# Patient Record
Sex: FEMALE | Race: WHITE | NOT HISPANIC OR LATINO | Employment: UNEMPLOYED | URBAN - METROPOLITAN AREA
[De-identification: names, ages, dates, MRNs, and addresses within clinical notes are randomized per-mention and may not be internally consistent; named-entity substitution may affect disease eponyms.]

---

## 2024-05-02 ENCOUNTER — OFFICE VISIT (OUTPATIENT)
Dept: PRIMARY CARE | Facility: CLINIC | Age: 49
End: 2024-05-02
Payer: COMMERCIAL

## 2024-05-02 VITALS
HEART RATE: 100 BPM | TEMPERATURE: 98 F | SYSTOLIC BLOOD PRESSURE: 108 MMHG | WEIGHT: 116 LBS | RESPIRATION RATE: 20 BRPM | HEIGHT: 64 IN | BODY MASS INDEX: 19.81 KG/M2 | DIASTOLIC BLOOD PRESSURE: 70 MMHG

## 2024-05-02 DIAGNOSIS — N63.20 MASS OF LEFT BREAST, UNSPECIFIED QUADRANT: ICD-10-CM

## 2024-05-02 DIAGNOSIS — Z00.00 WELLNESS EXAMINATION: Primary | ICD-10-CM

## 2024-05-02 DIAGNOSIS — Z12.31 ENCOUNTER FOR SCREENING MAMMOGRAM FOR MALIGNANT NEOPLASM OF BREAST: ICD-10-CM

## 2024-05-02 DIAGNOSIS — Z12.11 COLON CANCER SCREENING: ICD-10-CM

## 2024-05-02 DIAGNOSIS — F17.200 SMOKER: ICD-10-CM

## 2024-05-02 PROBLEM — N95.1 PERIMENOPAUSAL VASOMOTOR SYMPTOMS: Status: ACTIVE | Noted: 2024-05-02

## 2024-05-02 PROBLEM — M71.9 DISORDER OF BURSAE OF SHOULDER REGION: Status: RESOLVED | Noted: 2017-07-24 | Resolved: 2024-05-02

## 2024-05-02 PROBLEM — J01.00 ACUTE MAXILLARY SINUSITIS: Status: RESOLVED | Noted: 2024-05-02 | Resolved: 2024-05-02

## 2024-05-02 PROCEDURE — 99386 PREV VISIT NEW AGE 40-64: CPT | Performed by: NURSE PRACTITIONER

## 2024-05-02 RX ORDER — IBUPROFEN 200 MG
1 TABLET ORAL EVERY 24 HOURS
Qty: 30 PATCH | Refills: 0 | Status: SHIPPED | OUTPATIENT
Start: 2024-05-02 | End: 2024-06-01

## 2024-05-02 RX ORDER — NICOTINE 7MG/24HR
1 PATCH, TRANSDERMAL 24 HOURS TRANSDERMAL EVERY 24 HOURS
Qty: 14 PATCH | Refills: 0 | Status: SHIPPED | OUTPATIENT
Start: 2024-06-14 | End: 2024-06-28

## 2024-05-02 RX ORDER — IBUPROFEN 200 MG
1 TABLET ORAL EVERY 24 HOURS
Qty: 14 PATCH | Refills: 0 | Status: SHIPPED | OUTPATIENT
Start: 2024-06-01 | End: 2024-06-15

## 2024-05-02 ASSESSMENT — ENCOUNTER SYMPTOMS
SHORTNESS OF BREATH: 0
ABDOMINAL PAIN: 0
CONSTIPATION: 0
FATIGUE: 1
DIARRHEA: 0
COUGH: 1
WHEEZING: 0
FEVER: 0
PALPITATIONS: 0
CHILLS: 0
HEADACHES: 0
WEAKNESS: 0

## 2024-05-02 NOTE — PROGRESS NOTES
"Subjective   Jennifer Baez is a 48 y.o. female who presents for Annual Exam.  HPI States her periods stopped 7-9 months ago.   Patient reported health fair  Regular Dental Visits no  Regular Eye Visits no  Hearing loss no  Balanced Diet yes  Weight Concerns no  Exercise Regular  Review of Systems   Constitutional:  Positive for fatigue. Negative for chills and fever.   Respiratory:  Positive for cough. Negative for shortness of breath and wheezing.    Cardiovascular:  Negative for chest pain and palpitations.   Gastrointestinal:  Negative for abdominal pain, constipation and diarrhea.   Neurological:  Negative for weakness and headaches.     Objective   Physical Exam  Vitals reviewed.   Constitutional:       Appearance: Normal appearance.   HENT:      Head: Normocephalic.   Eyes:      Conjunctiva/sclera: Conjunctivae normal.   Cardiovascular:      Rate and Rhythm: Normal rate and regular rhythm.      Pulses: Normal pulses.   Pulmonary:      Breath sounds: Normal breath sounds.   Abdominal:      General: Bowel sounds are normal.      Palpations: Abdomen is soft.   Musculoskeletal:      Cervical back: Neck supple.   Skin:     General: Skin is warm and dry.   Neurological:      General: No focal deficit present.      Mental Status: She is alert and oriented to person, place, and time.   Psychiatric:         Mood and Affect: Mood normal.         Thought Content: Thought content normal.       /70   Pulse 100   Temp 36.7 °C (98 °F)   Resp 20   Ht 1.613 m (5' 3.5\")   Wt 52.6 kg (116 lb)   BMI 20.23 kg/m²   Assessment/Plan   Problem List Items Addressed This Visit       Breast mass, left    Current Assessment & Plan     States she had a biopsy and that it was found to be benign. She does not recall what year this was done.           Other Visit Diagnoses       Wellness examination    -  Primary    Relevant Orders    CBC    Lipid Panel    Hemoglobin A1C    Comprehensive Metabolic Panel    Vitamin B12    Vitamin " D 1,25 Dihydroxy (for eval of hypercalcemia)    TSH with reflex to Free T4 if abnormal    Colon cancer screening        Relevant Orders    Cologuard® colon cancer screening    Encounter for screening mammogram for malignant neoplasm of breast        Relevant Orders    BI mammo bilateral screening tomosynthesis    Smoker        Started smoking around 13 years old, regularly at 16. She is intersted in quitting.    Relevant Medications    nicotine (Nicoderm CQ) 21 mg/24 hr patch    nicotine (Nicoderm CQ) 14 mg/24 hr patch (Start on 6/1/2024)    nicotine (Nicoderm CQ) 7 mg/24 hr patch (Start on 6/14/2024)

## 2024-05-02 NOTE — ASSESSMENT & PLAN NOTE
States she had a biopsy and that it was found to be benign. She does not recall what year this was done.

## 2024-05-13 ENCOUNTER — APPOINTMENT (OUTPATIENT)
Dept: RADIOLOGY | Facility: CLINIC | Age: 49
End: 2024-05-13
Payer: COMMERCIAL

## 2024-11-04 ENCOUNTER — APPOINTMENT (OUTPATIENT)
Dept: PRIMARY CARE | Facility: CLINIC | Age: 49
End: 2024-11-04
Payer: COMMERCIAL

## 2025-01-02 ENCOUNTER — APPOINTMENT (OUTPATIENT)
Dept: PRIMARY CARE | Facility: CLINIC | Age: 50
End: 2025-01-02
Payer: COMMERCIAL

## 2025-04-01 ENCOUNTER — APPOINTMENT (OUTPATIENT)
Dept: PRIMARY CARE | Facility: CLINIC | Age: 50
End: 2025-04-01
Payer: COMMERCIAL

## 2025-04-01 VITALS
WEIGHT: 111 LBS | DIASTOLIC BLOOD PRESSURE: 90 MMHG | BODY MASS INDEX: 18.95 KG/M2 | OXYGEN SATURATION: 98 % | HEIGHT: 64 IN | RESPIRATION RATE: 16 BRPM | HEART RATE: 93 BPM | TEMPERATURE: 98.3 F | SYSTOLIC BLOOD PRESSURE: 183 MMHG

## 2025-04-01 DIAGNOSIS — E55.9 VITAMIN D INSUFFICIENCY: ICD-10-CM

## 2025-04-01 DIAGNOSIS — I10 PRIMARY HYPERTENSION: ICD-10-CM

## 2025-04-01 DIAGNOSIS — R92.8 ABNORMAL MAMMOGRAM: ICD-10-CM

## 2025-04-01 DIAGNOSIS — N95.1 VASOMOTOR SYMPTOMS DUE TO MENOPAUSE: ICD-10-CM

## 2025-04-01 DIAGNOSIS — Z13.29 SCREENING FOR THYROID DISORDER: ICD-10-CM

## 2025-04-01 DIAGNOSIS — Z13.228 ENCOUNTER FOR SCREENING FOR OTHER METABOLIC DISORDERS: ICD-10-CM

## 2025-04-01 DIAGNOSIS — N63.11 MASS OF UPPER OUTER QUADRANT OF RIGHT BREAST: Primary | ICD-10-CM

## 2025-04-01 DIAGNOSIS — Z13.220 SCREENING FOR LIPID DISORDERS: ICD-10-CM

## 2025-04-01 PROCEDURE — 99214 OFFICE O/P EST MOD 30 MIN: CPT | Performed by: NURSE PRACTITIONER

## 2025-04-01 ASSESSMENT — ENCOUNTER SYMPTOMS
ADENOPATHY: 1
UNEXPECTED WEIGHT CHANGE: 0
APPETITE CHANGE: 0
CHILLS: 0
FATIGUE: 0
SHORTNESS OF BREATH: 0
FEVER: 0

## 2025-04-01 NOTE — PROGRESS NOTES
"Subjective   Jennifer Baez is a 49 y.o. female who presents for Breast Mass.  This is on right side. She states it is the size of a golf ball and thinks she feels axillary lymph nodes as well. Denies c/o pain. Denies nipple changes. States she has rare clear drainage from nipples, but states that isn't a new problem.     HPI  Review of Systems   Constitutional:  Negative for appetite change, chills, fatigue, fever and unexpected weight change.   Respiratory:  Negative for shortness of breath.    Cardiovascular:  Negative for chest pain.   Genitourinary:  Negative for menstrual problem (No longer having menses. States it's been over a year, but doesn't remember exactly how long.).   Hematological:  Positive for adenopathy (right anterior axillary firm, oval, freely mobile, nodes).       Objective     Physical Exam  Constitutional:       General: She is not in acute distress.     Appearance: She is underweight. She is not ill-appearing.   Cardiovascular:      Rate and Rhythm: Normal rate and regular rhythm.   Chest:   Breasts:     Breasts are asymmetrical.      Right: Mass (Large, hard, round, freely mobile mass at 9-10 o'clock position adjacent to the areola) present. No bleeding, inverted nipple, nipple discharge, skin change or tenderness.      Left: Normal.       Lymphadenopathy:      Head:      Right side of head: No submental adenopathy.      Left side of head: No submental adenopathy.      Cervical: No cervical adenopathy.      Upper Body:      Right upper body: Axillary adenopathy (2 right axillary firm, oval/round, freely mobile LNs approx 1 cm diameter each) present. No supraclavicular or pectoral adenopathy.      Left upper body: No supraclavicular, axillary or pectoral adenopathy.   Neurological:      Mental Status: She is alert.         BP (!) 183/90   Pulse 93   Temp 36.8 °C (98.3 °F)   Resp 16   Ht 1.626 m (5' 4\")   Wt 50.3 kg (111 lb)   SpO2 98%   BMI 19.05 kg/m²     Assessment/Plan     Problem " List Items Addressed This Visit       Vasomotor symptoms due to menopause    Overview     States last menses was well over a year ago. Doesn't recall exactly when.          Relevant Orders    Referral to Gynecology     Other Visit Diagnoses       Mass of upper outer quadrant of right breast    -  Primary    Relevant Orders    Follow Up In [facility] - PCP - Health Maintenance    Abnormal mammogram        Relevant Orders    BI mammo bilateral diagnostic tomosynthesis        F/U in 1-2 weeks for review of results.

## 2025-04-08 ENCOUNTER — APPOINTMENT (OUTPATIENT)
Dept: RADIOLOGY | Facility: CLINIC | Age: 50
End: 2025-04-08
Payer: COMMERCIAL

## 2025-04-08 ENCOUNTER — HOSPITAL ENCOUNTER (OUTPATIENT)
Dept: RADIOLOGY | Facility: CLINIC | Age: 50
End: 2025-04-08
Payer: COMMERCIAL

## 2025-04-11 ENCOUNTER — HOSPITAL ENCOUNTER (OUTPATIENT)
Dept: RADIOLOGY | Facility: HOSPITAL | Age: 50
Discharge: HOME | End: 2025-04-11
Payer: COMMERCIAL

## 2025-04-11 VITALS — BODY MASS INDEX: 18.95 KG/M2 | HEIGHT: 64 IN | WEIGHT: 111 LBS

## 2025-04-11 DIAGNOSIS — R92.8 ABNORMAL MAMMOGRAM: ICD-10-CM

## 2025-04-11 DIAGNOSIS — N63.0 UNSPECIFIED LUMP IN UNSPECIFIED BREAST: ICD-10-CM

## 2025-04-11 DIAGNOSIS — R92.1 CALCIFICATION OF LEFT BREAST ON MAMMOGRAPHY: Primary | ICD-10-CM

## 2025-04-11 DIAGNOSIS — R92.8 OTHER ABNORMAL AND INCONCLUSIVE FINDINGS ON DIAGNOSTIC IMAGING OF BREAST: ICD-10-CM

## 2025-04-11 PROBLEM — I10 PRIMARY HYPERTENSION: Status: ACTIVE | Noted: 2025-04-11

## 2025-04-11 PROBLEM — N63.20 BREAST MASS, LEFT: Status: RESOLVED | Noted: 2024-05-02 | Resolved: 2025-04-11

## 2025-04-11 PROBLEM — N63.11 MASS OF UPPER OUTER QUADRANT OF RIGHT BREAST: Status: ACTIVE | Noted: 2025-04-11

## 2025-04-11 PROBLEM — E55.9 VITAMIN D INSUFFICIENCY: Status: ACTIVE | Noted: 2025-04-11

## 2025-04-11 PROBLEM — R59.0 AXILLARY LYMPHADENOPATHY: Status: ACTIVE | Noted: 2025-04-11

## 2025-04-11 PROCEDURE — 76642 ULTRASOUND BREAST LIMITED: CPT | Mod: RT

## 2025-04-11 PROCEDURE — 76982 USE 1ST TARGET LESION: CPT

## 2025-04-11 PROCEDURE — 77066 DX MAMMO INCL CAD BI: CPT

## 2025-04-15 ENCOUNTER — OFFICE VISIT (OUTPATIENT)
Dept: HEMATOLOGY/ONCOLOGY | Facility: HOSPITAL | Age: 50
End: 2025-04-15
Payer: COMMERCIAL

## 2025-04-15 VITALS
WEIGHT: 107.8 LBS | BODY MASS INDEX: 18.4 KG/M2 | DIASTOLIC BLOOD PRESSURE: 91 MMHG | HEART RATE: 94 BPM | OXYGEN SATURATION: 98 % | SYSTOLIC BLOOD PRESSURE: 178 MMHG | HEIGHT: 64 IN | RESPIRATION RATE: 20 BRPM | TEMPERATURE: 97.2 F

## 2025-04-15 DIAGNOSIS — Z01.818 PREPROCEDURAL EXAMINATION: ICD-10-CM

## 2025-04-15 DIAGNOSIS — R59.0 AXILLARY LYMPHADENOPATHY: ICD-10-CM

## 2025-04-15 DIAGNOSIS — N63.10 LARGE MASS OF RIGHT BREAST: Primary | ICD-10-CM

## 2025-04-15 PROCEDURE — 3008F BODY MASS INDEX DOCD: CPT | Performed by: NURSE PRACTITIONER

## 2025-04-15 PROCEDURE — 99213 OFFICE O/P EST LOW 20 MIN: CPT | Performed by: NURSE PRACTITIONER

## 2025-04-15 PROCEDURE — 3080F DIAST BP >= 90 MM HG: CPT | Performed by: NURSE PRACTITIONER

## 2025-04-15 PROCEDURE — 3077F SYST BP >= 140 MM HG: CPT | Performed by: NURSE PRACTITIONER

## 2025-04-15 PROCEDURE — 99203 OFFICE O/P NEW LOW 30 MIN: CPT | Performed by: NURSE PRACTITIONER

## 2025-04-15 ASSESSMENT — PAIN SCALES - GENERAL: PAINLEVEL_OUTOF10: 0-NO PAIN

## 2025-04-15 NOTE — PATIENT INSTRUCTIONS
Please call us at [phone] option 5 then option 2 with any questions or concerns.    Thank you for coming to see me today at . You are going to or have already been scheduled your biopsy of the right breast.  Our breast radiologist with assistance from the breast radiology team will be performing the biopsy.    After your biopsy, you may feel some mild discomfort and/or bruising around the biopsy site. This will gradually resolve over the next couple days to weeks.  If there is any bleeding, please hold firm pressure over the area for 30 minutes. If it does not stop, then please go to the closest emergency room. This is a very rare occurrence though it is important that you are aware.    The results of the biopsy can take about 5-10 business days. Our office will reach out to you to review the results with you over the phone.    Should you have any questions or concerns after biopsy, please call the office at [phone].    IMPORTANT INFORMATION REGARDING YOUR RESULTS    If you receive medical information from My [facility] Personal Health Record (online chart) your results will be released into your chart. This means you may view or see results of your biopsy or procedure before we are able to contact you directly. You will be called at our first opportunity to discuss your results.

## 2025-04-15 NOTE — PROGRESS NOTES
Jennifer Baez female   1975 49 y.o.   [id number]           HPI  Jennifer Baez is a 49 y.o.  female [facility] a right UOQ breast mass and 3 suspicious looking masses in her right axilla. Jennifer states that she has not had a mammogram in many years. She noticed the masses about 6 months ago and thought they may be related to a cold and though they would go away. Jennifer reports about a 10 pound weight loss over the last 6-12 months.      BREAST IMAGING:  Exam Information    Status Exam Begun Exam Ended   Final 4/11/2025 09:12 4/11/2025 10:31     Addendum    Interpreted By:  Nathalie Jameson,   ADDENDUM:  Method of Detection: Category Pat - Patient Reported Self-examination  Finding      Signed by: Nathalie Jameson 4/11/2025 4:48 PM      -------- ORIGINAL REPORT --------  Dictation workstation:   [id number]   Addended by Nathalie Jameson MD on 4/11/2025  4:48 PM     Study Result    Narrative & Impression   Interpreted By:  Nathalie Jameson,   STUDY:  BI MAMMO BILATERAL DIAGNOSTIC TOMOSYNTHESIS; BI US BREAST LIMITED  RIGHT;  4/11/2025 10:31 am; 4/11/2025 11:07 am      ACCESSION NUMBER(S):  [id number]; [id number]      ORDERING CLINICIAN:  RICKI WILLINGHAM      INDICATION:  Right breast and axillary palpable lumps x3; history of left breast  and axillary benign biopsy      COMPARISON:  11/30/2015      FINDINGS:  MAMMOGRAPHY: 2D and tomosynthesis images were reviewed at 1 mm slice  thickness.      Density:  The breasts are heterogeneously dense, which may obscure  small masses.      An irregular high-density mass is identified in the superolateral  right breast from middle to posterior depth, deep to the radiopaque  marker placed by the patient at the palpable area. 2 partially  visualized abnormal lymph nodes are seen in the right axilla, deep to  the 2 radiopaque markers placed by the patient at the palpable areas  of concern. Large area of coarse,  round, and punctate calcifications  are present in the superior central left breast from anterior to  posterior depth, with an associated biopsy tissue marker at the  benign biopsy site, increased in number compared to previous  examination.      ULTRASOUND:  Targeted ultrasound examination of the right breast and bilateral  axillae was performed by a registered sonographer with elastography.      Within the right breast, at the palpable area of concern at 11:00  position 3 cm from the nipple, an irregular hypoechoic mass measuring  2.9 x 2.6 x 1.9 cm is identified. It demonstrates increased internal  vascularity. It is markedly stiff on the elastography. This  correlates with the mass seen on the mammogram.      Within the right axilla, at the palpable areas of concern, 3 abnormal  lymph nodes and 1 unremarkable nodes are identified. Lymph node  labeled 3 demonstrates a worst morphology, measuring 1.5 x 1.4 x 0.9  cm with complete effacement of the fatty hilum.      Ultrasound of the left axilla was performed for comparison purposes.  3 unremarkable lymph nodes are identified in the left axilla.      IMPRESSION:  1. Suspicious right breast mass with right axillary lymphadenopathy.  Surgical consultation and ultrasound guided core needle biopsy of the  right breast mass and right axillary lymph node labeled 3 are  recommended. Findings and recommendations were discussed with the  patient by Dr. Jameson. A preprocedure form was completed.  2. Unremarkable left axillary lymph nodes.  3. Probably benign left breast calcifications. Short-term  mammographic follow-up in 6 months is recommend.      BI-RADS CATEGORY:      BI-RADS CATEGORY:  5 Highly Suggestive of Malignancy.  Recommendation:  Surgical Consultation and Biopsy.  Recommended Date:  Immediate.  Laterality:  Right.       REPRODUCTIVE HISTORY: menarche age 12 years,  menopause age 48 years  all vaginal deliveries, breast fed one baby for one year,  second baby for a few months, rest bottle fed.  fatty/scattered/heterogeneously/extremely dense breast tissue. Used oral contraception < 2years.    FAMILY CANCER HISTORY:     Maternal grandfather - unknown cancer  Paternal grandmother - oral cancer  Maternal grandmother - lung cancer (did not pass from cancer, passed from surgical error).        Past Medical History:   Diagnosis Date    Acute maxillary sinusitis 05/02/2024    Breast mass, left 05/02/2024       SURGICAL HISTORY:  Past Surgical History:   Procedure Laterality Date    BREAST BIOPSY Left 01/01/2015    TONSILLECTOMY  12/11/2015    Tonsillectomy    TUBAL LIGATION  12/11/2015    Tubal Ligation       REVIEW OF SYSTEMS:  denies any unusual headaches, balance issues, depression, cough, shortness of breath, problems swallowing, changes in chest/breast area, abdominal pain, bone or muscle pain, vaginal bleeding, rectal bleeding, blood in the urine, vaginal dryness, swelling arms or legs, new or unusual skin moles or lesions.      MEDICATIONS  See list    ALLERGIES  No Known Allergies     Patient Active Problem List    Diagnosis Date Noted    Preprocedural examination 04/15/2025    Primary hypertension 04/11/2025    Vitamin D insufficiency 04/11/2025    Mass of upper outer quadrant of right breast 04/11/2025    Abnormal mammogram 04/11/2025    Axillary lymphadenopathy 04/11/2025    Calcification of left breast on mammography 04/11/2025    Large mass of right breast 05/02/2024    Vasomotor symptoms due to menopause 05/02/2024        SOCIAL HISTORY    Social History     Tobacco Use    Smoking status: Every Day     Current packs/day: 0.50     Average packs/day: 0.5 packs/day for 35.3 years (17.6 ttl pk-yrs)     Types: Cigarettes     Start date: 1990    Smokeless tobacco: Never   Substance Use Topics    Alcohol use: Not Currently     Alcohol/week: 0.0 - 2.0 standard drinks of alcohol    Smokes one pack per day. Marijuana daily.    VITALS  Vitals:    04/15/25 1004    BP: (!) 178/91   Pulse: 94   Resp: 20   Temp: 36.2 °C (97.2 °F)   SpO2: 98%        PHYSICAL EXAM  Constitutional: Well developed, alert/oriented x3, no distress, cooperative   Eyes: clear sclera   ENMT: mucous membranes moist, no apparent lesions   Head/Neck: Neck supple, no bruits   Respiratory/Thorax: Patent airways, normal breath sounds with good chest expansion   Cardiovascular: Regular rate and rhythm, no murmurs, 2+ equal pulses of the extremities,   Gastrointestinal: Nondistended, soft, non-tender, no masses palpable, no organomegaly   Musculoskeletal: ROM intact, no joint swelling, normal strength   Extremities: normal extremities, no edema, cyanosis, contusions or wounds   Neurological: alert and oriented x3,  normal strength   Breast: Right breast UOQ with large >6cm tumor that is firm and fixed. Right axilla with 2 palpable large lymph nodes.   Lymphatic: No significant lymphadenopathy   Psychological: Appropriate mood and behavior   Skin: Warm and dry, no lesions, no rashes       ASSESSMENT/PLAN  Jennifer is a 50 yo woman who presents for a pre-biopsy history and physical for a large palpable right breast mass in the UOQ and 2 palpable (3 abnormal on imaging) lymph nodes in the right axilla.     Plan:  Discussed that the findings on clinical exam are concerning for breast cancer.   May proceed with biopsy.  We reviewed biopsy procedure.  Discussed that results will report to her MyChart. She can choose to look at them though I will call most likely at the end of the day, or, she can wait for my call.   Results take about 7-10 days.  All of Susi's questions/concerns were addressed.  We briefly talked about treatment that would be needed if it is cancer including scanning due to the size of the masses and her reports weight loss (about 10 lbs over 6 months).  Over 20 minutes of time was spent with this patient with >50% of the time with education, counseling, and coordination of care.        1. Large  mass of right breast        2. Axillary lymphadenopathy        3. Preprocedural examination             Theresa Awad, [facility]

## 2025-04-15 NOTE — PROGRESS NOTES
"Oncology Follow-Up    Jennifer Baez  [id number]       Cancer Staging   No matching staging information was found for the patient.    Oncology History    No history exists.         Subjective    ***        Objective      Vitals:    04/15/25 1004   BP: (!) 178/91   Pulse: 94   Resp: 20   Temp: 36.2 °C (97.2 °F)   SpO2: 98%        Constitutional: Well developed, alert/oriented x3, no distress, cooperative   Eyes: clear sclera   ENMT: mucous membranes moist, no apparent lesions   Head/Neck: Neck supple, no bruits   Respiratory/Thorax: Patent airways, normal breath sounds with good chest expansion   Cardiovascular: Regular rate and rhythm, no murmurs, 2+ equal pulses of the extremities,   Gastrointestinal: Nondistended, soft, non-tender, no masses palpable, no organomegaly   Musculoskeletal: ROM intact, no joint swelling, normal strength   Extremities: normal extremities, no edema, cyanosis, contusions or wounds   Neurological: alert and oriented x3,  normal strength   Breast:  left breast and axilla without masses, nodules, skin changes.  Right breast and axilla without masses nodules, skin changes mammoplasty is without masses, nodules, skin changes, discharge, well healed incisions.   Lymphatic: No significant lymphadenopathy   Psychological: Appropriate mood and behavior   Skin: Warm and dry, no lesions, no rashes      Physical Exam     No results found for: \"WBC\", \"HGB\", \"HCT\", \"MCV\", \"PLT\"    Chemistry    No results found for: \"NA\", \"K\", \"CL\", \"CO2\", \"BUN\", \"CREATININE\", \"GLU\" No results found for: \"CALCIUM\", \"ALKPHOS\", \"AST\", \"ALT\", \"BILITOT\"           Imaging:  ***        Assessment/Plan    There are no diagnoses linked to this encounter.    ***    Theresa Awad, APRN-CNP     "

## 2025-04-16 ENCOUNTER — TELEPHONE (OUTPATIENT)
Dept: RADIOLOGY | Facility: HOSPITAL | Age: 50
End: 2025-04-16
Payer: COMMERCIAL

## 2025-04-17 ENCOUNTER — APPOINTMENT (OUTPATIENT)
Dept: PRIMARY CARE | Facility: CLINIC | Age: 50
End: 2025-04-17
Payer: COMMERCIAL

## 2025-04-18 ENCOUNTER — HOSPITAL ENCOUNTER (OUTPATIENT)
Dept: RADIOLOGY | Facility: HOSPITAL | Age: 50
End: 2025-04-18
Payer: COMMERCIAL

## 2025-04-21 ENCOUNTER — APPOINTMENT (OUTPATIENT)
Dept: OBSTETRICS AND GYNECOLOGY | Facility: CLINIC | Age: 50
End: 2025-04-21
Payer: COMMERCIAL

## 2025-04-22 ENCOUNTER — TELEPHONE (OUTPATIENT)
Dept: PEDIATRICS | Facility: CLINIC | Age: 50
End: 2025-04-22

## 2025-04-22 ENCOUNTER — APPOINTMENT (OUTPATIENT)
Dept: PRIMARY CARE | Facility: CLINIC | Age: 50
End: 2025-04-22
Payer: COMMERCIAL

## 2025-04-22 NOTE — TELEPHONE ENCOUNTER
Pt called and rescheduled her appt and is wanting Juliana to reach out and give her a call. She is nervous about doing a biopsy and has some questions. She did not want to come in and discuss at an appt.

## 2025-04-29 ENCOUNTER — TELEPHONE (OUTPATIENT)
Dept: PRIMARY CARE | Facility: CLINIC | Age: 50
End: 2025-04-29
Payer: COMMERCIAL

## 2025-04-29 NOTE — TELEPHONE ENCOUNTER
Pt called will fax over a record release form to get her records from you as she has switched providers due to not having transportation. Wanted to inform you